# Patient Record
Sex: MALE | Race: WHITE
[De-identification: names, ages, dates, MRNs, and addresses within clinical notes are randomized per-mention and may not be internally consistent; named-entity substitution may affect disease eponyms.]

---

## 2017-07-13 ENCOUNTER — HOSPITAL ENCOUNTER (EMERGENCY)
Dept: HOSPITAL 23 - CED | Age: 52
LOS: 1 days | Discharge: HOME | End: 2017-07-14
Payer: COMMERCIAL
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
## 2017-07-13 DIAGNOSIS — Z88.0: ICD-10-CM

## 2017-07-13 DIAGNOSIS — I10: ICD-10-CM

## 2017-07-13 DIAGNOSIS — S62.635A: Primary | ICD-10-CM

## 2020-11-11 ENCOUNTER — OFFICE VISIT CONVERTED (OUTPATIENT)
Dept: NEUROLOGY | Facility: CLINIC | Age: 55
End: 2020-11-11
Attending: NURSE PRACTITIONER

## 2021-03-03 ENCOUNTER — OFFICE VISIT CONVERTED (OUTPATIENT)
Dept: NEUROLOGY | Facility: CLINIC | Age: 56
End: 2021-03-03
Attending: NURSE PRACTITIONER

## 2021-05-10 NOTE — H&P
History and Physical      Patient Name: Kj Lora   Patient ID: [id number]   Sex: Male   YOB: 1965    Referring Provider: Kinsey Dooley    Visit Date: November 11, 2020    Provider: GERTRUDIS Painting   Location: [facility]   Location Address: [address] [place] [address]  [id number]   Location Phone: [phone]          Chief Complaint  · Migraines      History Of Present Illness  Kj Lora is a 55 year old /White male who presents today to [facility] today referred from Kinsey Dooley for evaluation of headaches.   He reports that he developed headaches many years ago. Since that time, his headaches have progressively worsened.   Currently, he reports headaches that are located frontal. He characterizes the headaches as 8/10 in severity, squeezing and throbbing in nature with associated photophobia, phonophobia, and nausea. His headaches last 8-10 hours. He reports 17 headache days per month. He denies associated aura. He denies focal numbness, weakness, speech, and vision changes.   Triggers: Denies any known triggers   Symptoms improved by: Dark quiet room and Sleep   He states he is sleeping poorly. Reports getting 4 hours of sleep per night. Denies snoring. Reports refreshing sleep.   Prior prophylactic medications include: topiramate, Depakote, gabapentin, BB contraindicated due to medication interactions, amitriptyline   He uses abortive therapy such as: Imitrex, Maxalt, Triptans contraindicated d/t hx of DVT and anticoagulant therapy. NSAIDs contraindicated due to warfarin.   Caffeine Use: minimal   Independently Reviewed: Labs and Prior PCP records   History of Kidney Stones: No   He denies a family history of cerebral aneurysm.       Past Medical History  Arthritis; High blood pressure; High cholesterol; Hypertension; Migraine         Past Surgical History  Hernia; Knee repair; umbilical hernia repair; Vasectomy          Medication List  amlodipine 10 mg oral tablet; Fish Oil 1,000 mg (120 mg-180 mg) oral capsule; lisinopril 20 mg oral tablet; omeprazole 20 mg oral capsule,delayed release(DR/EC); simvastatin 40 mg oral tablet; topiramate 100 mg oral tablet; warfarin 5 mg oral tablet         Allergy List  PENICILLINS       Allergies Reconciled  Family Medical History  Diabetes, unspecified type; Family history of colon cancer         Social History  Alcohol (Never); Caffeine (Unknown); Second hand smoke exposure (Never); Tobacco (Never)         Review of Systems  · Constitutional  o Denies  o : chills, excessive sweating, fatigue, fever, sycope/passing out, weight gain, weight loss  · Eyes  o Denies  o : changes in vision, blurry vision, double vision  · HENT  o Admits  o : headaches  o Denies  o : loss of hearing, ringing in the ears, ear aches, sore throat, nasal congestion, sinus pain, nose bleeds, seasonal allergies  · Cardiovascular  o Denies  o : blood clots, swollen legs, anemia, easy burising or bleeding, transfusions  · Respiratory  o Denies  o : shortness of breath, dry cough, productive cough, pneumonia, COPD  · Gastrointestinal  o Denies  o : difficulty swallowing, reflux  · Genitourinary  o Denies  o : incontinence  · Neurologic  o Admits  o : headache  o Denies  o : seizure, stroke, tremor, loss of balance, falls, dizziness/vertigo, difficulty with sleep, numbness/tingling/paresthesia , difficulty with coordination, difficulty with dexterity, weakness  · Musculoskeletal  o Denies  o : neck stiffness/pain, swollen lymph nodes, muscle aches, joint pain, weakness, spasms, sciatica, pain radiating in arm, pain radiating in leg, low back pain  · Endocrine  o Denies  o : diabetes, thyroid disorder  · Psychiatric  o Denies  o : anxiety, depression      Vitals  Date Time BP Position Site L\R Cuff Size HR RR TEMP (F) WT  HT  BMI kg/m2 BSA m2 O2 Sat FR L/min FiO2        11/11/2020 02:22 /84 Sitting    78 - R  97.6  "203lbs 16oz 5'  10\" 29.27 2.14             Physical Examination  · Constitutional  o Appearance  o : well-nourished, well groomed, in no apparent distress  · Eyes  o Pupils and Irises  o : Pupils equal, round, and reactive to light and accommodation bilaterally  · Respiratory  o Auscultation of Lungs  o : Lungs were clear to ascultation bilaterally. No wheezes, rhonchi or rales were appreciated.  · Cardiovascular  o Heart  o :   § Auscultation of Heart  § : Regular rate and rhythm, no murmurs, gallops or rubs were appreciated.  o Peripheral Vascular System  o :   § Extremities  § : No peripheral edema was appreciated  · Musculoskeletal  o General  o : Normal bulk and normal tone.  · Neurologic  o Mental Status Examination  o :   § Orientation  § : Alert and oriented to person, place, and time,  § Speech/Language  § : Intact naming, comprehension, and repetition. No dysarthria.  § Memory  § : Immediate recall is 3/3. Recall at 5 minutes is 3/3.   § Attention  § : Attention span is intact to serial 7 examination and finger tapping.   § Fund of Knowledge  § : Adequate fund of knowledge  o Cranial Nerves  o : Pupils are equal, round and reactive to light. Extraocular movements are intact. Visual fields are full. Fundoscopic examination reveals sharp disc bilaterally. Sensation in the V1-V3 distribution is intact and symmetric. Muscles of mastication are strong and symmetric. Muscles of facial expression are strong and symmetric. Hearing is intact. Palatal raise is intact and symmetric. Uvula is midline. Shoulder shrug is strong. Tongue protrudes in the midline.  o Motor Examination  o :   § RUE Strength  § : strength normal  § LUE Strength  § : strength normal  § RLE Strength  § : strength normal  § LLE Strength  § : strength normal  o Reflexes  o : 2+ reflexes throughout and symmetric. Negative Castro. Negative Babinski.   o Sensation  o : Intact sensation to light touch, pinprick, vibration and proprioception " throughout.  o Gait and Station  o :   § Gait Screening  § : Normal, narrow based gait, with a normal arm swing.  o Coordination  o : Intact finger to nose and heel to shin. Rapid alternating movements are intact in the upper and lower extremities.          Assessment  · Intractable migraine without aura and without status migrainosus     346.11/G43.019  Will start Aimovig for preventative therapy of migraine and Nurtec PRN for abortive therapy.       Plan  · Medications  o Aimovig Autoinjector 140 mg/mL subcutaneous auto-injector   SIG: inject 140 mg by subcutaneous route once a month in the abdomen, thigh, or outer area of upper arm for 28 days   DISP: (1) Pre-filled Pen Syringe with 3 refills  Prescribed on 11/11/2020     o Nurtec ODT 75 mg oral tablet,disintegrating   SIG: Dissolve 1 tablet on top of tongue then swallow. Max 1 tablet/day   DISP: (8) Tablet with 3 refills  Prescribed on 11/11/2020     o Medications have been Reconciled  o Transition of Care or Provider Policy  · Instructions  o Encouraged to follow-up with Primary Care Provider for preventative care.  o Call or Return if symptoms worsen or persist.  o Follow up in 3 months.  · Disposition  o Call or Return if symptoms worsen or persist.  · Referrals  o ID: [id number] Date: 11/11/2020 Type: Inbound  Specialty: Neurology            Electronically Signed by: GERTRUDIS Painting -Author on November 13, 2020 01:48:16 PM

## 2021-05-14 VITALS
HEART RATE: 78 BPM | HEIGHT: 70 IN | TEMPERATURE: 97.6 F | SYSTOLIC BLOOD PRESSURE: 127 MMHG | WEIGHT: 204 LBS | DIASTOLIC BLOOD PRESSURE: 84 MMHG | BODY MASS INDEX: 29.2 KG/M2

## 2021-05-14 VITALS
BODY MASS INDEX: 29.78 KG/M2 | SYSTOLIC BLOOD PRESSURE: 145 MMHG | HEART RATE: 76 BPM | DIASTOLIC BLOOD PRESSURE: 90 MMHG | HEIGHT: 70 IN | WEIGHT: 208 LBS

## 2021-05-14 NOTE — PROGRESS NOTES
Progress Note      Patient Name: Kj Lora   Patient ID: [id number]   Sex: Male   YOB: 1965    Referring Provider: Kinsey Dooley    Visit Date: March 3, 2021    Provider: GERTRUDIS Painting   Location: [facility]   Location Address: [address] [place] [address]  [id number]   Location Phone: [phone]          Chief Complaint  · Migraines      History Of Present Illness  Kj Lora is a 56 year old /White male who presents today to [facility] today referred from Kinsey Dooley for evaluation of headaches.   He reports that he developed headaches many years ago. Since that time, his headaches have progressively worsened.   Currently, he reports headaches that are located frontal. He characterizes the headaches as 8/10 in severity, squeezing and throbbing in nature with associated photophobia, phonophobia, and nausea. His headaches last 8-10 hours. He reports 17 headache days per month. He denies associated aura. He denies focal numbness, weakness, speech, and vision changes.   Triggers: Denies any known triggers   Symptoms improved by: Dark quiet room and Sleep   He states he is sleeping poorly. Reports getting 4 hours of sleep per night. Denies snoring. Reports refreshing sleep.   Prior prophylactic medications include: topiramate, Depakote, gabapentin, BB contraindicated due to medication interactions, amitriptyline   He uses abortive therapy such as: Imitrex, Maxalt, Triptans contraindicated d/t hx of DVT and anticoagulant therapy. NSAIDs contraindicated due to warfarin.   Caffeine Use: minimal   Independently Reviewed: Labs and Prior PCP records   History of Kidney Stones: No   He denies a family history of cerebral aneurysm.   Kj Lora is a 56 year old /White male who presents today to [facility] today referred from Kinsey Dooley for follow up. States he's doing much better since starting Aimovig. Having  "about 12 headache days per month, but severity is improved dramatically on those days. Only having about 6 days where he's debilitated from migraine.       Review of Systems  · Constitutional  o Denies  o : chills, excessive sweating, fatigue, fever, sycope/passing out, weight gain, weight loss  · Eyes  o Denies  o : changes in vision, blurry vision, double vision  · HENT  o Admits  o : headaches  o Denies  o : loss of hearing, ringing in the ears, ear aches, sore throat, nasal congestion, sinus pain, nose bleeds, seasonal allergies  · Cardiovascular  o Denies  o : blood clots, swollen legs, anemia, easy burising or bleeding, transfusions  · Respiratory  o Denies  o : shortness of breath, dry cough, productive cough, pneumonia, COPD  · Gastrointestinal  o Denies  o : difficulty swallowing, reflux  · Genitourinary  o Denies  o : incontinence  · Neurologic  o Admits  o : headache  o Denies  o : seizure, stroke, tremor, loss of balance, falls, dizziness/vertigo, difficulty with sleep, numbness/tingling/paresthesia , difficulty with coordination, difficulty with dexterity, weakness  · Musculoskeletal  o Denies  o : neck stiffness/pain, swollen lymph nodes, muscle aches, joint pain, weakness, spasms, sciatica, pain radiating in arm, pain radiating in leg, low back pain  · Endocrine  o Denies  o : diabetes, thyroid disorder  · Psychiatric  o Denies  o : anxiety, depression      Vitals  Date Time BP Position Site L\R Cuff Size HR RR TEMP (F) WT  HT  BMI kg/m2 BSA m2 O2 Sat FR L/min FiO2 HC       03/03/2021 08:29 /90 Sitting    76 - R   208lbs 0oz 5'  10\" 29.84 2.16             Physical Examination  · Constitutional  o Appearance  o : well-nourished, well groomed, in no apparent distress  · Eyes  o Pupils and Irises  o : Pupils equal, round, and reactive to light and accommodation bilaterally  · Respiratory  o Auscultation of Lungs  o : Lungs were clear to ascultation bilaterally. No wheezes, rhonchi or rales were " appreciated.  · Cardiovascular  o Heart  o :   § Auscultation of Heart  § : Regular rate and rhythm, no murmurs, gallops or rubs were appreciated.  o Peripheral Vascular System  o :   § Extremities  § : No peripheral edema was appreciated  · Musculoskeletal  o General  o : Normal bulk and normal tone.  · Neurologic  o Mental Status Examination  o :   § Orientation  § : Alert and oriented to person, place, and time,  § Speech/Language  § : Intact naming, comprehension, and repetition. No dysarthria.  § Memory  § : Immediate recall is 3/3. Recall at 5 minutes is 3/3.   § Attention  § : Attention span is intact to serial 7 examination and finger tapping.   § Fund of Knowledge  § : Adequate fund of knowledge  o Cranial Nerves  o : Pupils are equal, round and reactive to light. Extraocular movements are intact. Visual fields are full. Fundoscopic examination reveals sharp disc bilaterally. Sensation in the V1-V3 distribution is intact and symmetric. Muscles of mastication are strong and symmetric. Muscles of facial expression are strong and symmetric. Hearing is intact. Palatal raise is intact and symmetric. Uvula is midline. Shoulder shrug is strong. Tongue protrudes in the midline.  o Motor Examination  o :   § RUE Strength  § : strength normal  § LUE Strength  § : strength normal  § RLE Strength  § : strength normal  § LLE Strength  § : strength normal  o Reflexes  o : 2+ reflexes throughout and symmetric. Negative Castro. Negative Babinski.   o Sensation  o : Intact sensation to light touch, pinprick, vibration and proprioception throughout.  o Gait and Station  o :   § Gait Screening  § : Normal, narrow based gait, with a normal arm swing.  o Coordination  o : Intact finger to nose and heel to shin. Rapid alternating movements are intact in the upper and lower extremities.          Assessment  · Intractable migraine without aura and without status migrainosus     346.11/G43.019  Will continue Aimovig for  preventative therapy of migraine and Nurtec PRN for abortive therapy.      Plan  · Medications  o Aimovig Autoinjector 140 mg/mL subcutaneous auto-injector   SIG: inject 140 mg by subcutaneous route once a month in the abdomen, thigh, or outer area of upper arm for 84 days   DISP: (3) Pre-filled Pen Syringe with 1 refills  Adjusted on 03/03/2021     o Nurtec ODT 75 mg oral tablet,disintegrating   SIG: Dissolve 1 tablet on top of tongue then swallow. Max 1 tablet/day   DISP: (24) Tablet with 1 refills  Adjusted on 03/03/2021     · Instructions  o Call or Return if symptoms persist.  o Follow up in 3 months.  · Disposition  o Call or Return if symptoms worsen or persist.  · Referrals  o ID: [id number] Date: 11/11/2020 Type: Inbound  Specialty: Neurology            Electronically Signed by: Amada Michele APRN -Author on March 3, 2021 08:48:16 AM

## 2021-06-03 ENCOUNTER — OFFICE VISIT CONVERTED (OUTPATIENT)
Dept: NEUROLOGY | Facility: CLINIC | Age: 56
End: 2021-06-03
Attending: NURSE PRACTITIONER

## 2021-06-05 NOTE — PROGRESS NOTES
Progress Note      Patient Name: Kj Lora   Patient ID: [id number]   Sex: Male   YOB: 1965    Referring Provider: Analilia Coreas    Visit Date: Jaycee 3, 2021    Provider: GERTRUDIS Painting   [facility]   Location Address: [address] [place] [address]  [id number]   Location Phone: [phone]          Chief Complaint     3 month f/u       History Of Present Illness  Kj Lora is a 56 year old /White male who presents today to [facility] today referred from Kinsey Dooley for evaluation of headaches.   He reports that he developed headaches many years ago. Since that time, his headaches have progressively worsened.   Currently, he reports headaches that are located frontal. He characterizes the headaches as 8/10 in severity, squeezing and throbbing in nature with associated photophobia, phonophobia, and nausea. His headaches last 8-10 hours. He reports 17 headache days per month. He denies associated aura. He denies focal numbness, weakness, speech, and vision changes.   Triggers: Denies any known triggers   Symptoms improved by: Dark quiet room and Sleep   He states he is sleeping poorly. Reports getting 4 hours of sleep per night. Denies snoring. Reports refreshing sleep.   Prior prophylactic medications include: topiramate, Depakote, gabapentin, BB contraindicated due to medication interactions, amitriptyline   He uses abortive therapy such as: Imitrex, Maxalt, Triptans contraindicated d/t hx of DVT and anticoagulant therapy. NSAIDs contraindicated due to warfarin.   Caffeine Use: minimal   Independently Reviewed: Labs and Prior PCP records   History of Kidney Stones: No   He denies a family history of cerebral aneurysm.   Kj Lora is a 56 year old /White male who presents today to [facility] today referred from Kinsey Dooley for follow up. States he's doing much better since starting Aimovig.  Having about 10 headache days per month, but severity is improved dramatically on those days. Only having about 6 days where he's debilitated from migraine. Nurtec is effective abortive therapy.       Past Medical History  Arthritis; High blood pressure; High cholesterol; Hypertension; Migraine         Past Surgical History  Hernia; Knee repair; umbilical hernia repair; Vasectomy         Medication List  Aimovig Autoinjector 140 mg/mL subcutaneous auto-injector; amlodipine 10 mg oral tablet; Fish Oil 1,000 mg (120 mg-180 mg) oral capsule; lisinopril 20 mg oral tablet; Nurtec ODT 75 mg oral tablet,disintegrating; omeprazole 20 mg oral capsule,delayed release(DR/EC); simvastatin 40 mg oral tablet; topiramate 100 mg oral tablet; warfarin 5 mg oral tablet         Allergy List  PENICILLINS       Allergies Reconciled  Family Medical History  Diabetes, unspecified type; Family history of colon cancer         Social History  Alcohol (Never); Caffeine (Unknown); Second hand smoke exposure (Never); Tobacco (Never)         Review of Systems  · Constitutional  o Denies  o : chills, excessive sweating, fatigue, fever, sycope/passing out, weight gain, weight loss  · Eyes  o Denies  o : changes in vision, blurry vision, double vision  · HENT  o Admits  o : headaches  o Denies  o : loss of hearing, ringing in the ears, ear aches, sore throat, nasal congestion, sinus pain, nose bleeds, seasonal allergies  · Cardiovascular  o Denies  o : blood clots, swollen legs, anemia, easy burising or bleeding, transfusions  · Respiratory  o Denies  o : shortness of breath, dry cough, productive cough, pneumonia, COPD  · Gastrointestinal  o Denies  o : difficulty swallowing, reflux  · Genitourinary  o Denies  o : incontinence  · Neurologic  o Admits  o : headache  o Denies  o : seizure, stroke, tremor, loss of balance, falls, dizziness/vertigo, difficulty with sleep, numbness/tingling/paresthesia , difficulty with coordination, difficulty with  "dexterity, weakness  · Musculoskeletal  o Denies  o : neck stiffness/pain, swollen lymph nodes, muscle aches, joint pain, weakness, spasms, sciatica, pain radiating in arm, pain radiating in leg, low back pain  · Endocrine  o Denies  o : diabetes, thyroid disorder  · Psychiatric  o Denies  o : anxiety, depression      Vitals  Date Time BP Position Site L\R Cuff Size HR RR TEMP (F) WT  HT  BMI kg/m2 BSA m2 O2 Sat FR L/min FiO2 HC       06/03/2021 02:37 /93 Sitting    86 - R   210lbs 0oz 5'  10\" 30.13 2.17       06/03/2021 02:37 PM                         Physical Examination  · Constitutional  o Appearance  o : well-nourished, well groomed, in no apparent distress  · Eyes  o Pupils and Irises  o : Pupils equal, round, and reactive to light and accommodation bilaterally  · Respiratory  o Auscultation of Lungs  o : Lungs were clear to ascultation bilaterally. No wheezes, rhonchi or rales were appreciated.  · Cardiovascular  o Heart  o :   § Auscultation of Heart  § : Regular rate and rhythm, no murmurs, gallops or rubs were appreciated.  o Peripheral Vascular System  o :   § Extremities  § : No peripheral edema was appreciated  · Musculoskeletal  o General  o : Normal bulk and normal tone.  · Neurologic  o Mental Status Examination  o :   § Orientation  § : Alert and oriented to person, place, and time,  § Speech/Language  § : Intact naming, comprehension, and repetition. No dysarthria.  § Memory  § : Immediate recall is 3/3. Recall at 5 minutes is 3/3.   § Attention  § : Attention span is intact to serial 7 examination and finger tapping.   § Fund of Knowledge  § : Adequate fund of knowledge  o Cranial Nerves  o : Pupils are equal, round and reactive to light. Extraocular movements are intact. Visual fields are full. Fundoscopic examination reveals sharp disc bilaterally. Sensation in the V1-V3 distribution is intact and symmetric. Muscles of mastication are strong and symmetric. Muscles of facial expression are " strong and symmetric. Hearing is intact. Palatal raise is intact and symmetric. Uvula is midline. Shoulder shrug is strong. Tongue protrudes in the midline.  o Motor Examination  o :   § RUE Strength  § : strength normal  § LUE Strength  § : strength normal  § RLE Strength  § : strength normal  § LLE Strength  § : strength normal  o Reflexes  o : 2+ reflexes throughout and symmetric. Negative Castro. Negative Babinski.   o Sensation  o : Intact sensation to light touch, pinprick, vibration and proprioception throughout.  o Gait and Station  o :   § Gait Screening  § : Normal, narrow based gait, with a normal arm swing.  o Coordination  o : Intact finger to nose and heel to shin. Rapid alternating movements are intact in the upper and lower extremities.          Assessment  · Intractable migraine without aura and without status migrainosus     346.11/G43.019  Will continue Aimovig for preventative therapy of migraine and Nurtec PRN for abortive therapy.      Plan  · Medications  o Aimovig Autoinjector 140 mg/mL subcutaneous auto-injector   SIG: inject 140 mg by subcutaneous route once a month in the abdomen, thigh, or outer area of upper arm for 84 days   DISP: (3) Pre-filled Pen Syringe with 1 refills  Refilled on 06/03/2021     o Nurtec ODT 75 mg oral tablet,disintegrating   SIG: Dissolve 1 tablet on top of tongue then swallow. Max 1 tablet/day   DISP: (24) Tablet with 1 refills  Refilled on 06/03/2021     o Medications have been Reconciled  o Transition of Care or Provider Policy  · Instructions  o f/u 3 months   · Disposition  o Call or Return if symptoms worsen or persist.            Electronically Signed by: GERTRUDIS Painting -Author on Jaycee 3, 2021 02:45:45 PM

## 2021-07-15 VITALS
HEART RATE: 86 BPM | WEIGHT: 210 LBS | SYSTOLIC BLOOD PRESSURE: 134 MMHG | DIASTOLIC BLOOD PRESSURE: 93 MMHG | HEIGHT: 70 IN | BODY MASS INDEX: 30.06 KG/M2

## 2023-08-08 NOTE — PROGRESS NOTES
"Subjective   Patient ID: Kj Lora is a 58 y.o. male is being seen for consultation today at the request of Kinsey Dooley MD for cervicalgia. Patient had a MRI C-spine done on 05/23/2023 @ [facility]        History of Present Illness  Kj has had pain in his neck and going down his arm for some time.  This past May around Mother's Day he had a severe exacerbation of his cervical radiculopathy with pain shooting down his arm.  He went to the ED was given Toradol shot as well as steroids and morphine which was not helpful.  Eventually this pain did subside spontaneously but it still does come up occasionally.  He currently chews tobacco.  He has a history of PTSD.  He has a history of DVTs and takes Coumadin to maintain his INR between 2 and 3.      Review of Systems   Constitutional:  Negative for fever.   Musculoskeletal:  Positive for neck pain and neck stiffness.   Neurological:  Positive for weakness, numbness and headaches.       Objective     Vitals:    08/11/23 1211   BP: 116/72   BP Location: Left arm   Patient Position: Sitting   Cuff Size: Adult   Weight: 95.3 kg (210 lb)   Height: 177.8 cm (70\")   PainSc: 0-No pain     Body mass index is 30.13 kg/mý.    Tobacco Use: Unknown    Smoking Tobacco Use: Never    Smokeless Tobacco Use: Unknown    Passive Exposure: Not on file          Physical Exam  Constitutional:       Appearance: Normal appearance.   HENT:      Head: Normocephalic and atraumatic.   Eyes:      Extraocular Movements: Extraocular movements intact.      Conjunctiva/sclera: Conjunctivae normal.      Pupils: Pupils are equal, round, and reactive to light.   Cardiovascular:      Rate and Rhythm: Normal rate and regular rhythm.      Pulses: Normal pulses.   Pulmonary:      Breath sounds: Normal breath sounds.   Abdominal:      Palpations: Abdomen is soft.   Musculoskeletal:         General: Normal range of motion.      Cervical back: Normal range of motion and neck supple.   Skin:     General: " Skin is warm and dry.   Neurological:      Mental Status: He is alert and oriented to person, place, and time.      Cranial Nerves: Cranial nerves 2-12 are intact.      Motor: Motor function is intact. No weakness or atrophy.      Coordination: Coordination is intact. Romberg sign negative. Romberg Test normal.      Gait: Gait is intact. Gait normal.      Deep Tendon Reflexes: Reflexes are normal and symmetric.      Reflex Scores:       Tricep reflexes are 2+ on the right side and 2+ on the left side.       Bicep reflexes are 2+ on the right side and 2+ on the left side.       Brachioradialis reflexes are 2+ on the right side and 2+ on the left side.       Patellar reflexes are 2+ on the right side and 2+ on the left side.       Achilles reflexes are 2+ on the right side and 2+ on the left side.  Psychiatric:         Speech: Speech normal.     Neurologic Exam     Mental Status   Oriented to person, place, and time.   Attention: normal. Concentration: normal.   Speech: speech is normal   Level of consciousness: alert    Cranial Nerves   Cranial nerves II through XII intact.     CN III, IV, VI   Pupils are equal, round, and reactive to light.    Motor Exam   Muscle bulk: normal  Overall muscle tone: normal    Strength   Strength 5/5 except as noted.     Sensory Exam   Light touch normal.     Gait, Coordination, and Reflexes     Gait  Gait: normal    Coordination   Romberg: negative    Reflexes   Reflexes 2+ except as noted.   Right brachioradialis: 2+  Left brachioradialis: 2+  Right biceps: 2+  Left biceps: 2+  Right triceps: 2+  Left triceps: 2+  Right patellar: 2+  Left patellar: 2+  Right achilles: 2+  Left achilles: 2+        Assessment & Plan   Independent Review of Radiographic Studies:      I personally reviewed the images from the following studies.    MRI of the cervical spine shows degenerative disc disease at C5-6 and C6-7 with central disc herniation at C5-6 and broad-based disc bulge at C6-7 both causing  foraminal narrowing    Medical Decision Making:      Symptoms are consistent with his MRI which she describes as cervical radiculopathy down his left arm.  I think he would benefit from a C5-6 and C6-7 ACDF, however he is currently chewing tobacco and will need to be off nicotine for about 3 to 6 months.  We discussed ACDF surgery but I do want to avoid potential pseudoarthrosis due to his nicotine usage and so our plan will be to have an x-ray and DEXA scan done with epidural steroid injection to give him some relief while he attempts nicotine cessation.    Diagnoses and all orders for this visit:    1. Cervical radiculopathy (Primary)  -     Epidural Block  -     dexa bone density axial; Future  -     XR spine cervical 2 or 3 vw; Future    2. Degenerative disc disease, cervical  -     Epidural Block  -     dexa bone density axial; Future  -     XR spine cervical 2 or 3 vw; Future      No follow-ups on file.

## 2023-08-11 ENCOUNTER — OFFICE VISIT (OUTPATIENT)
Dept: NEUROSURGERY | Facility: CLINIC | Age: 58
End: 2023-08-11
Payer: OTHER GOVERNMENT

## 2023-08-11 ENCOUNTER — HOSPITAL ENCOUNTER (OUTPATIENT)
Dept: GENERAL RADIOLOGY | Facility: HOSPITAL | Age: 58
Discharge: HOME | End: 2023-08-11
Admitting: NEUROLOGICAL SURGERY
Payer: OTHER GOVERNMENT

## 2023-08-11 VITALS
BODY MASS INDEX: 30.06 KG/M2 | WEIGHT: 210 LBS | DIASTOLIC BLOOD PRESSURE: 72 MMHG | SYSTOLIC BLOOD PRESSURE: 116 MMHG | HEIGHT: 70 IN

## 2023-08-11 DIAGNOSIS — M50.30 DEGENERATIVE DISC DISEASE, CERVICAL: ICD-10-CM

## 2023-08-11 DIAGNOSIS — M54.12 CERVICAL RADICULOPATHY: Primary | ICD-10-CM

## 2023-08-11 DIAGNOSIS — M54.12 CERVICAL RADICULOPATHY: ICD-10-CM

## 2023-08-11 PROCEDURE — 72040 X-RAY EXAM NECK SPINE 2-3 VW: CPT

## 2023-08-11 RX ORDER — LISINOPRIL 10 MG/1
10 TABLET ORAL DAILY
COMMUNITY

## 2023-08-11 RX ORDER — AMITRIPTYLINE HYDROCHLORIDE 25 MG/1
25 TABLET, FILM COATED ORAL NIGHTLY
COMMUNITY
Start: 2023-06-30

## 2023-08-11 RX ORDER — ALBUTEROL SULFATE 0.63 MG/3ML
1 SOLUTION RESPIRATORY (INHALATION) 3 TIMES DAILY PRN
COMMUNITY
Start: 2023-03-30

## 2023-08-11 RX ORDER — WARFARIN SODIUM 5 MG/1
5 TABLET ORAL NIGHTLY
COMMUNITY

## 2023-08-11 RX ORDER — AMLODIPINE BESYLATE 10 MG/1
10 TABLET ORAL DAILY
COMMUNITY

## 2023-08-11 RX ORDER — SIMVASTATIN 40 MG
40 TABLET ORAL NIGHTLY
COMMUNITY

## 2023-08-11 RX ORDER — OMEPRAZOLE 20 MG/1
20 CAPSULE, DELAYED RELEASE ORAL DAILY
COMMUNITY

## 2023-09-26 ENCOUNTER — HOSPITAL ENCOUNTER (OUTPATIENT)
Dept: BONE DENSITY | Facility: HOSPITAL | Age: 58
Discharge: HOME | End: 2023-09-26
Admitting: NEUROLOGICAL SURGERY
Payer: OTHER GOVERNMENT

## 2023-09-26 DIAGNOSIS — M50.30 DEGENERATIVE DISC DISEASE, CERVICAL: ICD-10-CM

## 2023-09-26 DIAGNOSIS — M54.12 CERVICAL RADICULOPATHY: ICD-10-CM

## 2023-09-26 PROCEDURE — 77080 DXA BONE DENSITY AXIAL: CPT

## 2025-01-20 ENCOUNTER — PREP FOR SURGERY (OUTPATIENT)
Dept: OTHER | Facility: HOSPITAL | Age: 60
End: 2025-01-20
Payer: OTHER GOVERNMENT

## 2025-01-20 ENCOUNTER — OFFICE VISIT (OUTPATIENT)
Dept: SURGERY | Facility: CLINIC | Age: 60
End: 2025-01-20
Payer: OTHER GOVERNMENT

## 2025-01-20 VITALS
HEIGHT: 70 IN | BODY MASS INDEX: 10.16 KG/M2 | WEIGHT: 70.99 LBS | DIASTOLIC BLOOD PRESSURE: 91 MMHG | HEART RATE: 66 BPM | OXYGEN SATURATION: 98 % | SYSTOLIC BLOOD PRESSURE: 127 MMHG

## 2025-01-20 DIAGNOSIS — Z80.0 FAMILY HISTORY OF COLON CANCER: ICD-10-CM

## 2025-01-20 DIAGNOSIS — Z12.11 SCREENING FOR MALIGNANT NEOPLASM OF COLON: Primary | ICD-10-CM

## 2025-01-20 DIAGNOSIS — Z86.0100 HISTORY OF COLONIC POLYPS: ICD-10-CM

## 2025-01-20 RX ORDER — SODIUM CHLORIDE 0.9 % (FLUSH) 0.9 %
10 SYRINGE (ML) INJECTION AS NEEDED
OUTPATIENT
Start: 2025-01-20

## 2025-01-20 RX ORDER — PEG-3350, SODIUM SULFATE, SODIUM CHLORIDE, POTASSIUM CHLORIDE, SODIUM ASCORBATE AND ASCORBIC ACID 7.5-2.691G
1000 KIT ORAL ONCE
Qty: 1000 ML | Refills: 0 | Status: SHIPPED | OUTPATIENT
Start: 2025-01-20 | End: 2025-01-20

## 2025-01-20 RX ORDER — SODIUM CHLORIDE 0.9 % (FLUSH) 0.9 %
3 SYRINGE (ML) INJECTION EVERY 12 HOURS SCHEDULED
OUTPATIENT
Start: 2025-01-20

## 2025-01-20 RX ORDER — SODIUM CHLORIDE 9 MG/ML
40 INJECTION, SOLUTION INTRAVENOUS AS NEEDED
OUTPATIENT
Start: 2025-01-20

## 2025-01-20 RX ORDER — SIMETHICONE 80 MG
TABLET,CHEWABLE ORAL
Qty: 4 TABLET | Refills: 0 | Status: SHIPPED | OUTPATIENT
Start: 2025-01-20

## 2025-01-20 NOTE — H&P (VIEW-ONLY)
Chief Complaint: Colonoscopy    Subjective      Colonoscopy consultation       History of Present Illness  Kj Lora is a 60 y.o. male presents to [facility] for colonoscopy consultation.    Patient presents today on referral from Dr. Kinsey Dooley for colonoscopy consultation.  Patient denies any abdominal pain, change in bowel habit, or rectal bleeding.  Admits to family history of colon cancer with his maternal grandmother.  Patient reports last colonoscopy was 6 to 7 years ago and had some polyps removed.    Admits to JOSELITO.  Does not use a CPAP.    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    Denies taking any GLP-1 receptors.      Objective     Past Medical History:   Diagnosis Date    Arthritis     Clotting disorder     HBP (high blood pressure)     High cholesterol     HTN (hypertension)     Migraines     TBI (traumatic brain injury)     White matter disease        Past Surgical History:   Procedure Laterality Date    HERNIA REPAIR      KNEE SURGERY      REPAIR    UMBILICAL HERNIA REPAIR  2014    VASECTOMY         Outpatient Medications Marked as Taking for the 1/20/25 encounter (Office Visit) with Alvino April, APRN   Medication Sig Dispense Refill    albuterol (ACCUNEB) 0.63 MG/3ML nebulizer solution Take 3 mL by nebulization 3 (Three) Times a Day As Needed.      amLODIPine (NORVASC) 10 MG tablet Take 1 tablet by mouth Daily.      lisinopril (PRINIVIL,ZESTRIL) 10 MG tablet Take 1 tablet by mouth Daily.      omeprazole (priLOSEC) 20 MG capsule Take 1 capsule by mouth Daily.      simvastatin (ZOCOR) 40 MG tablet Take 1 tablet by mouth Every Night.      warfarin (COUMADIN) 5 MG tablet Take 1 tablet by mouth Every Night.         Allergies   Allergen Reactions    Penicillins Anaphylaxis    Hydrocodone-Acetaminophen Itching    Rivaroxaban Other (See Comments)     thrombosis        Family History   Problem Relation Age of Onset    Diabetes Maternal Grandmother      "Colon cancer Paternal Grandmother 60       Social History     Socioeconomic History    Marital status:    Tobacco Use    Smoking status: Never     Passive exposure: Never    Smokeless tobacco: Current     Types: Snuff   Vaping Use    Vaping status: Never Used   Substance and Sexual Activity    Alcohol use: Never    Drug use: Never       Review of Systems   Constitutional:  Negative for chills and fever.   Gastrointestinal:  Negative for abdominal distention, abdominal pain, anal bleeding, blood in stool, constipation, diarrhea and rectal pain.        Vital Signs:   /91 (BP Location: Left arm, Patient Position: Sitting, Cuff Size: Large Adult)   Pulse 66   Ht 177.8 cm (70\")   Wt 32.2 kg (70 lb 15.8 oz)   SpO2 98%   BMI 10.19 kg/m²      Physical Exam  Vitals and nursing note reviewed.   Constitutional:       General: He is not in acute distress.     Appearance: Normal appearance. He is not ill-appearing.   HENT:      Head: Normocephalic and atraumatic.   Cardiovascular:      Rate and Rhythm: Normal rate.   Pulmonary:      Effort: Pulmonary effort is normal.      Breath sounds: No stridor.   Abdominal:      Palpations: Abdomen is soft.      Tenderness: There is no guarding.   Musculoskeletal:         General: No deformity. Normal range of motion.   Skin:     General: Skin is warm and dry.      Coloration: Skin is not jaundiced.   Neurological:      General: No focal deficit present.      Mental Status: He is alert and oriented to person, place, and time.   Psychiatric:         Mood and Affect: Mood normal.         Thought Content: Thought content normal.          Result Review :          []  Laboratory  []  Radiology  []  Pathology  []  Microbiology  []  EKG/Telemetry   []  Cardiology/Vascular   []  Old records  I spent 15 minutes caring for Kj on this date of service. This time includes time spent by me in the following activities: reviewing tests, obtaining and/or reviewing a separately " obtained history, performing a medically appropriate examination and/or evaluation, ordering medications, tests, or procedures, and documenting information in the medical record        Assessment and Plan    Diagnoses and all orders for this visit:    1. Screening for malignant neoplasm of colon (Primary)    2. Family history of colon cancer    Other orders  -     PEG-KCl-NaCl-NaSulf-Na Asc-C (MOVIPREP) 100 g reconstituted solution powder; Take 1,000 mL by mouth 1 (One) Time for 1 dose.  Dispense: 1000 mL; Refill: 0  -     simethicone (MYLICON) 80 MG chewable tablet; Take 2 tablets PO after completing movi prep and 2 tablets PO 4 hours prior to procedure.  Dispense: 4 tablet; Refill: 0    Hold Coumadin starting 2/13/25.        Follow Up   Return for Schedule colonoscopy with Dr. Charles on 2/18/2025 at [facility].    Hospital arrival time: 12:30    Possible risks/complications, benefits, and alternatives to surgical or invasive procedures have been explained to patient and/or legal guardian.    Patient has been evaluated and can tolerate anesthesia and/or sedation. Risks, benefits, and alternatives to anesthesia and sedation have been explained to the patient and/or legal guardian. Patient verbalizes understanding and is willing to proceed with the above plan.     Patient was given instructions and counseling regarding his condition or for health maintenance advice. Please see specific information pulled into the AVS if appropriate.     Thank you for allowing me to participate in the care of this patient. Please call with questions or concerns.

## 2025-01-20 NOTE — PROGRESS NOTES
Chief Complaint: Colonoscopy    Subjective      Colonoscopy consultation       History of Present Illness  Kj Lora is a 60 y.o. male presents to [facility] for colonoscopy consultation.    Patient presents today on referral from Dr. Kinsey Dooley for colonoscopy consultation.  Patient denies any abdominal pain, change in bowel habit, or rectal bleeding.  Admits to family history of colon cancer with his maternal grandmother.  Patient reports last colonoscopy was 6 to 7 years ago and had some polyps removed.    Admits to JOSELITO.  Does not use a CPAP.    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    Denies taking any GLP-1 receptors.      Objective     Past Medical History:   Diagnosis Date    Arthritis     Clotting disorder     HBP (high blood pressure)     High cholesterol     HTN (hypertension)     Migraines     TBI (traumatic brain injury)     White matter disease        Past Surgical History:   Procedure Laterality Date    HERNIA REPAIR      KNEE SURGERY      REPAIR    UMBILICAL HERNIA REPAIR  2014    VASECTOMY         Outpatient Medications Marked as Taking for the 1/20/25 encounter (Office Visit) with Alvino April, APRN   Medication Sig Dispense Refill    albuterol (ACCUNEB) 0.63 MG/3ML nebulizer solution Take 3 mL by nebulization 3 (Three) Times a Day As Needed.      amLODIPine (NORVASC) 10 MG tablet Take 1 tablet by mouth Daily.      lisinopril (PRINIVIL,ZESTRIL) 10 MG tablet Take 1 tablet by mouth Daily.      omeprazole (priLOSEC) 20 MG capsule Take 1 capsule by mouth Daily.      simvastatin (ZOCOR) 40 MG tablet Take 1 tablet by mouth Every Night.      warfarin (COUMADIN) 5 MG tablet Take 1 tablet by mouth Every Night.         Allergies   Allergen Reactions    Penicillins Anaphylaxis    Hydrocodone-Acetaminophen Itching    Rivaroxaban Other (See Comments)     thrombosis        Family History   Problem Relation Age of Onset    Diabetes Maternal Grandmother      "Colon cancer Paternal Grandmother 60       Social History     Socioeconomic History    Marital status:    Tobacco Use    Smoking status: Never     Passive exposure: Never    Smokeless tobacco: Current     Types: Snuff   Vaping Use    Vaping status: Never Used   Substance and Sexual Activity    Alcohol use: Never    Drug use: Never       Review of Systems   Constitutional:  Negative for chills and fever.   Gastrointestinal:  Negative for abdominal distention, abdominal pain, anal bleeding, blood in stool, constipation, diarrhea and rectal pain.        Vital Signs:   /91 (BP Location: Left arm, Patient Position: Sitting, Cuff Size: Large Adult)   Pulse 66   Ht 177.8 cm (70\")   Wt 32.2 kg (70 lb 15.8 oz)   SpO2 98%   BMI 10.19 kg/m²      Physical Exam  Vitals and nursing note reviewed.   Constitutional:       General: He is not in acute distress.     Appearance: Normal appearance. He is not ill-appearing.   HENT:      Head: Normocephalic and atraumatic.   Cardiovascular:      Rate and Rhythm: Normal rate.   Pulmonary:      Effort: Pulmonary effort is normal.      Breath sounds: No stridor.   Abdominal:      Palpations: Abdomen is soft.      Tenderness: There is no guarding.   Musculoskeletal:         General: No deformity. Normal range of motion.   Skin:     General: Skin is warm and dry.      Coloration: Skin is not jaundiced.   Neurological:      General: No focal deficit present.      Mental Status: He is alert and oriented to person, place, and time.   Psychiatric:         Mood and Affect: Mood normal.         Thought Content: Thought content normal.          Result Review :          []  Laboratory  []  Radiology  []  Pathology  []  Microbiology  []  EKG/Telemetry   []  Cardiology/Vascular   []  Old records  I spent 15 minutes caring for Kj on this date of service. This time includes time spent by me in the following activities: reviewing tests, obtaining and/or reviewing a separately " obtained history, performing a medically appropriate examination and/or evaluation, ordering medications, tests, or procedures, and documenting information in the medical record        Assessment and Plan    Diagnoses and all orders for this visit:    1. Screening for malignant neoplasm of colon (Primary)    2. Family history of colon cancer    Other orders  -     PEG-KCl-NaCl-NaSulf-Na Asc-C (MOVIPREP) 100 g reconstituted solution powder; Take 1,000 mL by mouth 1 (One) Time for 1 dose.  Dispense: 1000 mL; Refill: 0  -     simethicone (MYLICON) 80 MG chewable tablet; Take 2 tablets PO after completing movi prep and 2 tablets PO 4 hours prior to procedure.  Dispense: 4 tablet; Refill: 0    Hold Coumadin starting 2/13/25.        Follow Up   Return for Schedule colonoscopy with Dr. Charles on 2/18/2025 at [facility].    Hospital arrival time: 12:30    Possible risks/complications, benefits, and alternatives to surgical or invasive procedures have been explained to patient and/or legal guardian.    Patient has been evaluated and can tolerate anesthesia and/or sedation. Risks, benefits, and alternatives to anesthesia and sedation have been explained to the patient and/or legal guardian. Patient verbalizes understanding and is willing to proceed with the above plan.     Patient was given instructions and counseling regarding his condition or for health maintenance advice. Please see specific information pulled into the AVS if appropriate.     Thank you for allowing me to participate in the care of this patient. Please call with questions or concerns.

## 2025-02-07 ENCOUNTER — TELEPHONE (OUTPATIENT)
Dept: SURGERY | Facility: CLINIC | Age: 60
End: 2025-02-07
Payer: OTHER GOVERNMENT

## 2025-02-07 NOTE — TELEPHONE ENCOUNTER
I've called Dr. Dooley office to requested an updated clearance to hold the coumadin 5 days prior. The number needs to be changed not just ok. I gave the staff a good fax number.

## 2025-02-12 NOTE — PRE-PROCEDURE INSTRUCTIONS
Reminded of arrival time at 1230, Entrance C of the [facility]. Instructed to bring or have a  over the age of 18 set up to drive you home the day of procedure.    Instructed on clear liquid diet the day before, nothing red or purple. Call with any questions about the prep or if in need of the prep.  Reminded them not to eat or drink anything am of procedure unless its a sip of water with medications.  Patient verbalized understanding. Reminded to hold Coumadin for 5 days prior to procedure.

## 2025-02-17 ENCOUNTER — ANESTHESIA EVENT (OUTPATIENT)
Dept: GASTROENTEROLOGY | Facility: HOSPITAL | Age: 60
End: 2025-02-17
Payer: OTHER GOVERNMENT

## 2025-02-17 NOTE — ANESTHESIA PREPROCEDURE EVALUATION
Anesthesia Evaluation     Patient summary reviewed and Nursing notes reviewed   NPO Solid Status: > 8 hours  NPO Liquid Status: > 4 hours           Airway   Mallampati: I  TM distance: >3 FB  Neck ROM: full  No difficulty expected  Dental    (+) partials    Comment: Upper/lower partials removed prior to procedure     Pulmonary     breath sounds clear to auscultation  Cardiovascular - normal exam  Exercise tolerance: good (4-7 METS)    PT is on anticoagulation therapy  Rhythm: regular  Rate: normal    (+) hypertension well controlled 2 medications or greater, DVT resolved, hyperlipidemia      Neuro/Psych  (+) headaches, numbness    ROS Comment: TBI    GI/Hepatic/Renal/Endo    (+) obesity, GERD well controlled    Musculoskeletal     (+) neck pain, radiculopathy  Abdominal    Substance History      OB/GYN          Other   arthritis, blood dyscrasia,     ROS/Med Hx Other: Warfarin last dose -02/12    PT/INR pending     Cardiac clearance from Dr. Castellon to withhold warfarin for 5 days given on 1/30/25    No EKG on file       Phys Exam Other: Full beard               Anesthesia Plan    ASA 3     general   total IV anesthesia  (Total IV Anesthesia    Patient understands anesthesia not responsible for dental damage.      Discussed risks with pt including aspiration, allergic reactions, apnea, advanced airway placement. Pt verbalized understanding. All questions answered.     )  intravenous induction     Anesthetic plan, risks, benefits, and alternatives have been provided, discussed and informed consent has been obtained with: patient.  Pre-procedure education provided  Plan discussed with CRNA.      CODE STATUS:

## 2025-02-18 ENCOUNTER — ANESTHESIA (OUTPATIENT)
Dept: GASTROENTEROLOGY | Facility: HOSPITAL | Age: 60
End: 2025-02-18
Payer: OTHER GOVERNMENT

## 2025-02-18 ENCOUNTER — HOSPITAL ENCOUNTER (OUTPATIENT)
Facility: HOSPITAL | Age: 60
Setting detail: HOSPITAL OUTPATIENT SURGERY
Discharge: HOME | End: 2025-02-18
Attending: SURGERY | Admitting: SURGERY
Payer: OTHER GOVERNMENT

## 2025-02-18 VITALS
TEMPERATURE: 98 F | RESPIRATION RATE: 14 BRPM | WEIGHT: 206.79 LBS | SYSTOLIC BLOOD PRESSURE: 110 MMHG | OXYGEN SATURATION: 94 % | BODY MASS INDEX: 29.67 KG/M2 | HEART RATE: 64 BPM | DIASTOLIC BLOOD PRESSURE: 73 MMHG

## 2025-02-18 DIAGNOSIS — Z86.0100 HISTORY OF COLONIC POLYPS: ICD-10-CM

## 2025-02-18 DIAGNOSIS — Z12.11 SCREENING FOR MALIGNANT NEOPLASM OF COLON: ICD-10-CM

## 2025-02-18 LAB
INR PPP: 1.03 (ref 0.86–1.15)
PROTHROMBIN TIME: 13.9 SECONDS (ref 11.8–14.9)

## 2025-02-18 PROCEDURE — 85610 PROTHROMBIN TIME: CPT | Performed by: SURGERY

## 2025-02-18 PROCEDURE — 25810000003 LACTATED RINGERS PER 1000 ML

## 2025-02-18 PROCEDURE — 88305 TISSUE EXAM BY PATHOLOGIST: CPT | Performed by: SURGERY

## 2025-02-18 PROCEDURE — 25010000002 PROPOFOL 10 MG/ML EMULSION: Performed by: NURSE ANESTHETIST, CERTIFIED REGISTERED

## 2025-02-18 PROCEDURE — 25010000002 LIDOCAINE PF 2% 2 % SOLUTION: Performed by: NURSE ANESTHETIST, CERTIFIED REGISTERED

## 2025-02-18 RX ORDER — PROPOFOL 10 MG/ML
VIAL (ML) INTRAVENOUS AS NEEDED
Status: DISCONTINUED | OUTPATIENT
Start: 2025-02-18 | End: 2025-02-18 | Stop reason: SURG

## 2025-02-18 RX ORDER — SODIUM CHLORIDE 0.9 % (FLUSH) 0.9 %
10 SYRINGE (ML) INJECTION AS NEEDED
Status: DISCONTINUED | OUTPATIENT
Start: 2025-02-18 | End: 2025-02-18 | Stop reason: HOSPADM

## 2025-02-18 RX ORDER — SODIUM CHLORIDE, SODIUM LACTATE, POTASSIUM CHLORIDE, CALCIUM CHLORIDE 600; 310; 30; 20 MG/100ML; MG/100ML; MG/100ML; MG/100ML
30 INJECTION, SOLUTION INTRAVENOUS CONTINUOUS
Status: DISCONTINUED | OUTPATIENT
Start: 2025-02-18 | End: 2025-02-18 | Stop reason: HOSPADM

## 2025-02-18 RX ORDER — LIDOCAINE HYDROCHLORIDE 20 MG/ML
INJECTION, SOLUTION EPIDURAL; INFILTRATION; INTRACAUDAL; PERINEURAL AS NEEDED
Status: DISCONTINUED | OUTPATIENT
Start: 2025-02-18 | End: 2025-02-18 | Stop reason: SURG

## 2025-02-18 RX ORDER — SODIUM CHLORIDE 9 MG/ML
40 INJECTION, SOLUTION INTRAVENOUS AS NEEDED
Status: DISCONTINUED | OUTPATIENT
Start: 2025-02-18 | End: 2025-02-18 | Stop reason: HOSPADM

## 2025-02-18 RX ORDER — SODIUM CHLORIDE 0.9 % (FLUSH) 0.9 %
3 SYRINGE (ML) INJECTION EVERY 12 HOURS SCHEDULED
Status: DISCONTINUED | OUTPATIENT
Start: 2025-02-18 | End: 2025-02-18 | Stop reason: HOSPADM

## 2025-02-18 RX ADMIN — PROPOFOL 50 MG: 10 INJECTION, EMULSION INTRAVENOUS at 13:13

## 2025-02-18 RX ADMIN — PROPOFOL 250 MCG/KG/MIN: 10 INJECTION, EMULSION INTRAVENOUS at 13:13

## 2025-02-18 RX ADMIN — SODIUM CHLORIDE, SODIUM LACTATE, POTASSIUM CHLORIDE, CALCIUM CHLORIDE 30 ML/HR: 20; 30; 600; 310 INJECTION, SOLUTION INTRAVENOUS at 11:49

## 2025-02-18 RX ADMIN — LIDOCAINE HYDROCHLORIDE 40 MG: 20 INJECTION, SOLUTION INTRAVENOUS at 13:13

## 2025-02-18 NOTE — ANESTHESIA POSTPROCEDURE EVALUATION
Patient: Kj Lora    Procedure Summary       Date: 02/18/25 Room / Location: [facility] END[facility] 3 / [facility]    Anesthesia Start: 1311 Anesthesia Stop: 1337    Procedure: COLONOSCOPY WITH HOT SNARE POLYPECTOMIES Diagnosis:       Screening for malignant neoplasm of colon      History of colonic polyps      (Screening for malignant neoplasm of colon [Z12.11])      (History of colonic polyps [Z86.0100])    Surgeons: Ignacio Charles MD Provider: Alexa Burton CRNA    Anesthesia Type: general ASA Status: 3            Anesthesia Type: general    Vitals  Vitals Value Taken Time   /73 02/18/25 1352   Temp 36.7 °C (98 °F) 02/18/25 1352   Pulse 64 02/18/25 1352   Resp 14 02/18/25 1352   SpO2 94 % 02/18/25 1352           Post Anesthesia Care and Evaluation    Patient location during evaluation: bedside  Patient participation: complete - patient participated  Level of consciousness: awake  Pain management: adequate    Airway patency: patent  Anesthetic complications: No anesthetic complications  PONV Status: controlled  Cardiovascular status: acceptable and stable  Respiratory status: acceptable

## 2025-02-20 LAB
CYTO UR: NORMAL
LAB AP CASE REPORT: NORMAL
LAB AP CLINICAL INFORMATION: NORMAL
PATH REPORT.FINAL DX SPEC: NORMAL
PATH REPORT.GROSS SPEC: NORMAL

## 2025-03-11 ENCOUNTER — OFFICE VISIT (OUTPATIENT)
Dept: SURGERY | Facility: CLINIC | Age: 60
End: 2025-03-11
Payer: OTHER GOVERNMENT

## 2025-03-11 VITALS — RESPIRATION RATE: 20 BRPM | WEIGHT: 206 LBS | HEIGHT: 70 IN | BODY MASS INDEX: 29.49 KG/M2

## 2025-03-11 DIAGNOSIS — D12.6 SESSILE SERRATED POLYP OF COLON: ICD-10-CM

## 2025-03-11 DIAGNOSIS — D36.9 TUBULAR ADENOMA: ICD-10-CM

## 2025-03-11 DIAGNOSIS — Z98.890 S/P COLONOSCOPY WITH POLYPECTOMY: Primary | ICD-10-CM

## 2025-03-11 PROCEDURE — 99213 OFFICE O/P EST LOW 20 MIN: CPT | Performed by: NURSE PRACTITIONER

## 2025-03-11 NOTE — PROGRESS NOTES
Chief Complaint: Follow-up    Subjective      Follow-up visit       History of Present Illness  Kj Lora is a 60 y.o. male presents to [facility] for follow-up visit    Patient presents today for follow-up visit after undergoing colonoscopy on 2/18/2025 performed by Dr. Ignacio Charles.  Patient was with a sessile serrated lesion of the ascending colon and 2 tubular adenomas of the transverse colon per colonoscopy/pathology.  Resection and retrieval were complete.    Results for orders placed or performed during the hospital encounter of 02/18/25   Protime-INR    Collection Time: 02/18/25 11:48 AM    Specimen: Arm, Right; Blood   Result Value Ref Range    Protime 13.9 11.8 - 14.9 Seconds    INR 1.03 0.86 - 1.15   Tissue Pathology Exam    Collection Time: 02/18/25  1:21 PM    Specimen: A: Large Intestine, Right / Ascending Colon; Polyp    B: Large Intestine, Transverse Colon; Tissue   Result Value Ref Range    Case Report       Surgical Pathology Report                         Case: [id number]                                  Authorizing Provider:  Ignacio Charles MD  Collected:           02/18/2025 01:21 PM          Ordering Location:     [facility] Received:            02/19/2025 09:35 AM                                 SUITES                                                                       Pathologist:           Anayeli He DO                                                       Specimens:   1) - Large Intestine, Right / Ascending Colon, ASCENDING COLON POLYP                                2) - Large Intestine, Transverse Colon, TRANSVERSE COLON POLYPS                            Clinical Information       Screening for malignant neoplasm of colon  History of colonic polyps      Final Diagnosis       1. Ascending colon polyp, biopsy:   - Sessile serrated lesion      2. Transverse colon polyps, biopsy:   - Fragments of tubular adenoma         "Gross Description       1. Large Intestine, Right / Ascending Colon.  Received in formalin and labeled \" ascending colon polyp\" is a 0.7 cm aggregate of tan soft tissue fragments. The specimen is entirely submitted in one cassette.    2. Large Intestine, Transverse Colon.  Received in formalin and labeled \" transverse colon polyps\" is a 0.7 cm aggregate of tan soft tissue fragments. The specimen is entirely submitted in one cassette.   MICHAEL      Microscopic Description       Microscopic examination performed.       Colonoscopy is performed without difficulty.  The patient tolerated the procedure well.    Patient denies any postoperative complications.  Objective     Past Medical History:   Diagnosis Date    Arthritis     Asthma     Clotting disorder     HBP (high blood pressure)     High cholesterol     HTN (hypertension)     Migraines     Sleep apnea     TBI (traumatic brain injury)     White matter disease        Past Surgical History:   Procedure Laterality Date    COLONOSCOPY      COLONOSCOPY N/A 2/18/2025    Procedure: COLONOSCOPY WITH HOT SNARE POLYPECTOMIES;  Surgeon: Ignacio Charles MD;  Location: [facility];  Service: General;  Laterality: N/A;  COLON POLYPS    HERNIA REPAIR      KNEE SURGERY      REPAIR    UMBILICAL HERNIA REPAIR  2014    VASECTOMY         Outpatient Medications Marked as Taking for the 3/11/25 encounter (Office Visit) with Alvino April, APRN   Medication Sig Dispense Refill    albuterol (ACCUNEB) 0.63 MG/3ML nebulizer solution Take 3 mL by nebulization 3 (Three) Times a Day As Needed.      amitriptyline (ELAVIL) 25 MG tablet Take 1 tablet by mouth Every Night.      amLODIPine (NORVASC) 10 MG tablet Take 1 tablet by mouth Daily.      lisinopril (PRINIVIL,ZESTRIL) 10 MG tablet Take 1 tablet by mouth Daily.      omeprazole (priLOSEC) 20 MG capsule Take 1 capsule by mouth Daily.      simvastatin (ZOCOR) 40 MG tablet Take 1 tablet by mouth Every Night.      warfarin (COUMADIN) 5 MG " "tablet Take 1 tablet by mouth Every Night.         Allergies   Allergen Reactions    Penicillins Anaphylaxis    Hydrocodone-Acetaminophen Itching    Rivaroxaban Other (See Comments)     thrombosis        Family History   Problem Relation Age of Onset    Lung cancer Father     Diabetes Maternal Grandmother     Colon cancer Paternal Grandmother 60       Social History     Socioeconomic History    Marital status:    Tobacco Use    Smoking status: Never     Passive exposure: Never    Smokeless tobacco: Current     Types: Snuff   Vaping Use    Vaping status: Never Used   Substance and Sexual Activity    Alcohol use: Never    Drug use: Never    Sexual activity: Defer       Review of Systems   Constitutional:  Negative for chills and fever.   Gastrointestinal:  Negative for abdominal distention, abdominal pain, anal bleeding, blood in stool, constipation, diarrhea and rectal pain.        Vital Signs:   Resp 20   Ht 177.8 cm (70\")   Wt 93.4 kg (206 lb)   BMI 29.56 kg/m²      Physical Exam  Vitals and nursing note reviewed.   Constitutional:       General: He is not in acute distress.     Appearance: Normal appearance. He is not ill-appearing.   HENT:      Head: Normocephalic and atraumatic.   Cardiovascular:      Rate and Rhythm: Normal rate.   Pulmonary:      Effort: Pulmonary effort is normal.      Breath sounds: No stridor.   Abdominal:      Palpations: Abdomen is soft.      Tenderness: There is no guarding.   Musculoskeletal:         General: No deformity. Normal range of motion.   Skin:     General: Skin is warm and dry.   Neurological:      General: No focal deficit present.      Mental Status: He is alert and oriented to person, place, and time.   Psychiatric:         Mood and Affect: Mood normal.         Thought Content: Thought content normal.          Result Review :          []  Laboratory  []  Radiology  []  Pathology  []  Microbiology  []  EKG/Telemetry   []  Cardiology/Vascular   []  Old " records  Today reviewed Dr. Charles's operative and pathology report.     Assessment and Plan    Diagnoses and all orders for this visit:    1. S/P colonoscopy with polypectomy (Primary)    2. Tubular adenoma    3. Sessile serrated polyp of colon        Follow Up   Return for Rescreen colon in 3 years; follow-up in the interim as needed.    Avoid high fat diets    Increase fiber intake    Per AGA guidelines patient will follow-up for colonoscopy surveillance as directed.    Patient was given instructions and counseling regarding his condition or for health maintenance advice. Please see specific information pulled into the AVS if appropriate.     As always, it has been a pleasure to participate in your patient's care. Please call with questions or concerns.

## (undated) DEVICE — CONN JET HYDRA H20 AUXILIARY DISP

## (undated) DEVICE — THE STERILE LIGHT HANDLE COVER IS USED WITH STERIS SURGICAL LIGHTING AND VISUALIZATION SYSTEMS.

## (undated) DEVICE — LINER SURG CANSTR SXN S/RIGD 1500CC

## (undated) DEVICE — SOLIDIFIER LIQLOC PLS 1500CC BT

## (undated) DEVICE — Device: Brand: DEFENDO AIR/WATER/SUCTION AND BIOPSY VALVE

## (undated) DEVICE — SOL IRR H2O BO 1000ML STRL

## (undated) DEVICE — SOL IRRG H2O PL/BG 1000ML STRL

## (undated) DEVICE — STERILE POLYISOPRENE POWDER-FREE SURGICAL GLOVES WITH EMOLLIENT COATING: Brand: PROTEXIS

## (undated) DEVICE — PAD GRND REM POLYHESIVE A/ DISP

## (undated) DEVICE — STERILE POLYISOPRENE POWDER-FREE SURGICAL GLOVES: Brand: PROTEXIS

## (undated) DEVICE — TUBING, SUCTION, 1/4" X 10', STRAIGHT: Brand: MEDLINE

## (undated) DEVICE — SNAR E/S POLYP SNAREMASTER OVL/10MM 2.8X2300MM YEL

## (undated) DEVICE — Device